# Patient Record
Sex: FEMALE | URBAN - METROPOLITAN AREA
[De-identification: names, ages, dates, MRNs, and addresses within clinical notes are randomized per-mention and may not be internally consistent; named-entity substitution may affect disease eponyms.]

---

## 2022-10-25 ENCOUNTER — OFFICE VISIT (OUTPATIENT)
Dept: URBAN - METROPOLITAN AREA CLINIC 27 | Facility: CLINIC | Age: 32
End: 2022-10-25
Payer: COMMERCIAL

## 2022-10-25 ENCOUNTER — DASHBOARD ENCOUNTERS (OUTPATIENT)
Age: 32
End: 2022-10-25

## 2022-10-25 VITALS
HEIGHT: 65 IN | TEMPERATURE: 97.7 F | HEART RATE: 67 BPM | WEIGHT: 177 LBS | BODY MASS INDEX: 29.49 KG/M2 | DIASTOLIC BLOOD PRESSURE: 74 MMHG | SYSTOLIC BLOOD PRESSURE: 125 MMHG | RESPIRATION RATE: 17 BRPM

## 2022-10-25 DIAGNOSIS — R76.8 HEPATITIS C ANTIBODY POSITIVE IN BLOOD: ICD-10-CM

## 2022-10-25 PROCEDURE — 99203 OFFICE O/P NEW LOW 30 MIN: CPT | Performed by: INTERNAL MEDICINE

## 2022-10-25 NOTE — HPI-TODAY'S VISIT:
Ms. Arrington is a 32-year-old woman who presents with a positive HCV antibody test performed as part of an STD panel.  She has never used IVDU or intranasal cocaine. She has not had a blood transfusion. She has never been told of a history of HCV. She does have multiple tattoos and piercings but all were obtained in a shop.

## 2022-10-26 ENCOUNTER — LAB OUTSIDE AN ENCOUNTER (OUTPATIENT)
Dept: URBAN - METROPOLITAN AREA CLINIC 27 | Facility: CLINIC | Age: 32
End: 2022-10-26

## 2022-11-02 LAB
A/G RATIO: 1.7
ALBUMIN: 4.2
ALKALINE PHOSPHATASE: 45
ALPHA 2-MACROGLOBULINS, QN: 130
ALT (SGPT): 14
ALT: 13
APOLIPOPROTEIN A-1: 142
AST (SGOT): 17
BILIRUBIN, TOTAL: 0.7
BILIRUBIN, TOTAL: 0.7
BUN/CREATININE RATIO: (no result)
BUN: 7
CALCIUM: 9.3
CARBON DIOXIDE, TOTAL: 25
CHLORIDE: 104
CREATININE: 0.6
EGFR: 123
FIBROSIS INTERPRETATION: (no result)
FIBROSIS SCORE: 0.07
FIBROSIS STAGE: (no result)
FOOTNOTE: (no result)
GGT: 43
GLOBULIN, TOTAL: 2.5
GLUCOSE: 106
HAPTOGLOBIN: 180
NECROINFLAMMAT ACTIVITY GRADE: (no result)
NECROINFLAMMAT ACTIVITY SCORE: 0.03
NECROINFLAMMAT INTERP: (no result)
POTASSIUM: 3.5
PROTEIN, TOTAL: 6.7
REFERENCE ID: (no result)
SODIUM: 138

## 2022-11-03 LAB
HEPATITIS B SURFACE ANTIGEN: (no result)
HEPATITIS C ANTIBODY: (no result)
HEPATITIS C VIRAL RNA: NOT DETECTED
INDEX: 0.1